# Patient Record
Sex: FEMALE | Race: WHITE | HISPANIC OR LATINO | URBAN - METROPOLITAN AREA
[De-identification: names, ages, dates, MRNs, and addresses within clinical notes are randomized per-mention and may not be internally consistent; named-entity substitution may affect disease eponyms.]

---

## 2019-05-21 ENCOUNTER — APPOINTMENT (RX ONLY)
Dept: URBAN - METROPOLITAN AREA CLINIC 11 | Facility: CLINIC | Age: 39
Setting detail: DERMATOLOGY
End: 2019-05-21

## 2019-05-21 DIAGNOSIS — Z41.1 ENCOUNTER FOR COSMETIC SURGERY: ICD-10-CM

## 2019-05-21 PROCEDURE — 99003: CPT

## 2019-05-21 PROCEDURE — ? CONSULTATION - AESTHETIC FACIAL DEFORMITY

## 2019-05-21 ASSESSMENT — LOCATION SIMPLE DESCRIPTION DERM
LOCATION SIMPLE: RIGHT CHEEK
LOCATION SIMPLE: LEFT CHEEK

## 2019-05-21 ASSESSMENT — LOCATION DETAILED DESCRIPTION DERM
LOCATION DETAILED: LEFT CENTRAL MALAR CHEEK
LOCATION DETAILED: RIGHT CENTRAL MALAR CHEEK

## 2019-05-21 ASSESSMENT — LOCATION ZONE DERM: LOCATION ZONE: FACE

## 2019-05-22 ENCOUNTER — APPOINTMENT (RX ONLY)
Dept: URBAN - METROPOLITAN AREA CLINIC 11 | Facility: CLINIC | Age: 39
Setting detail: DERMATOLOGY
End: 2019-05-22

## 2019-05-22 DIAGNOSIS — Z41.9 ENCOUNTER FOR PROCEDURE FOR PURPOSES OTHER THAN REMEDYING HEALTH STATE, UNSPECIFIED: ICD-10-CM

## 2019-05-22 PROCEDURE — ? FILLERS

## 2019-05-22 PROCEDURE — Z1093: HCPCS

## 2019-05-22 PROCEDURE — Z1034: HCPCS

## 2019-05-22 ASSESSMENT — LOCATION DETAILED DESCRIPTION DERM
LOCATION DETAILED: RIGHT CENTRAL MALAR CHEEK
LOCATION DETAILED: LEFT CENTRAL MALAR CHEEK
LOCATION DETAILED: RIGHT INFERIOR VERMILION LIP
LOCATION DETAILED: LEFT SUPERIOR VERMILION LIP

## 2019-05-22 ASSESSMENT — LOCATION SIMPLE DESCRIPTION DERM
LOCATION SIMPLE: LEFT LIP
LOCATION SIMPLE: RIGHT CHEEK
LOCATION SIMPLE: RIGHT LIP
LOCATION SIMPLE: LEFT CHEEK

## 2019-05-22 ASSESSMENT — LOCATION ZONE DERM
LOCATION ZONE: LIP
LOCATION ZONE: FACE

## 2019-05-22 NOTE — PROCEDURE: FILLERS
Brows Filler Volume In Cc: 0
Price $ (Numeric Only, No Text Please.): 550.00
Detail Level: Simple
Expiration Date (Month Year): 2020-03-01
Price $ (Numeric Only, No Text Please.): 650.00
Injected Anesthesia In Cc: 0.6
Use Map Statement For Sites (Optional): No
Expiration Date (Month Year): 2021-01-31
Filler: Juvederm Ultra XC
Topical Anesthetic #2: prilocaine
Topical Anesthetic Time (In Min): 5
Additional Area 2 Volume In Cc: 0.5
Show Price In Note?: yes
Lot #: [id number]
Additional Area 2 Location: left malar cheek
Lot #: [id number]
Administered By (Optional): Dr. Stuart Franco
Additional Area 1 Location: right malar cheek
Topical Anesthetic #3: phenenylephrine
Lower Lips Filler Volume In Cc: 0.1
Map Statment: See attached map for complete details
Topical Anesthetic #1: lidocaine
Filler: Herbie Tsang ([facility]lane-L)
Consent: Verbal and written consent obtained. Risks include but not limited to bruising, beading, irregular texture, ulceration, infection, allergic reaction, scar formation, incomplete augmentation, temporary nature, procedural pain.

## 2019-06-03 ENCOUNTER — APPOINTMENT (RX ONLY)
Dept: URBAN - METROPOLITAN AREA CLINIC 11 | Facility: CLINIC | Age: 39
Setting detail: DERMATOLOGY
End: 2019-06-03

## 2019-06-03 DIAGNOSIS — Z41.9 ENCOUNTER FOR PROCEDURE FOR PURPOSES OTHER THAN REMEDYING HEALTH STATE, UNSPECIFIED: ICD-10-CM

## 2019-06-03 PROCEDURE — 99005: CPT

## 2019-06-03 PROCEDURE — ? COSMETIC FOLLOW-UP

## 2019-06-03 ASSESSMENT — LOCATION DETAILED DESCRIPTION DERM
LOCATION DETAILED: RIGHT INFERIOR VERMILION LIP
LOCATION DETAILED: RIGHT SUPERIOR VERMILION LIP
LOCATION DETAILED: LEFT SUPERIOR VERMILION LIP
LOCATION DETAILED: RIGHT INFERIOR CENTRAL MALAR CHEEK
LOCATION DETAILED: LEFT INFERIOR VERMILION LIP
LOCATION DETAILED: LEFT INFERIOR CENTRAL MALAR CHEEK

## 2019-06-03 ASSESSMENT — LOCATION SIMPLE DESCRIPTION DERM
LOCATION SIMPLE: LEFT CHEEK
LOCATION SIMPLE: RIGHT CHEEK
LOCATION SIMPLE: LEFT LIP
LOCATION SIMPLE: RIGHT LIP

## 2019-06-03 ASSESSMENT — LOCATION ZONE DERM
LOCATION ZONE: LIP
LOCATION ZONE: FACE

## 2019-06-03 NOTE — PROCEDURE: COSMETIC FOLLOW-UP
Treatment (Optional): Filler Injection
Side Effects Or Complications: None
Patient Satisfaction: Very pleased
Global Improvement: Very Good
Detail Level: Simple